# Patient Record
Sex: FEMALE | Race: BLACK OR AFRICAN AMERICAN
[De-identification: names, ages, dates, MRNs, and addresses within clinical notes are randomized per-mention and may not be internally consistent; named-entity substitution may affect disease eponyms.]

---

## 2022-08-10 ENCOUNTER — APPOINTMENT (OUTPATIENT)
Dept: ORTHOPEDIC SURGERY | Facility: CLINIC | Age: 52
End: 2022-08-10

## 2022-08-10 VITALS — HEIGHT: 70 IN | BODY MASS INDEX: 31.5 KG/M2 | WEIGHT: 220 LBS

## 2022-08-10 DIAGNOSIS — Z78.9 OTHER SPECIFIED HEALTH STATUS: ICD-10-CM

## 2022-08-10 PROBLEM — Z00.00 ENCOUNTER FOR PREVENTIVE HEALTH EXAMINATION: Status: ACTIVE | Noted: 2022-08-10

## 2022-08-10 PROCEDURE — 99213 OFFICE O/P EST LOW 20 MIN: CPT

## 2022-08-10 PROCEDURE — 73564 X-RAY EXAM KNEE 4 OR MORE: CPT | Mod: RT

## 2022-08-10 NOTE — PROCEDURE
[Large Joint Injection] : Large joint injection [Right] : of the right [Knee] : knee [Pain] : pain [Inflammation] : inflammation [Alcohol] : alcohol [Betadine] : betadine [Ethyl Chloride sprayed topically] : ethyl chloride sprayed topically [Sterile technique used] : sterile technique used [___ cc    1%] : Lidocaine ~Vcc of 1%  [___ cc    0.5%] : Bupivacaine (Marcaine) ~Vcc of 0.5%  [Effusion] : effusion [] : Patient tolerated procedure well [Previous OTC use and PT nontherapeutic] : patient has tried OTC's including aspirin, Ibuprofen, Aleve, etc or prescription NSAIDS, and/or exercises at home and/or physical therapy without satisfactory response [Patient had decreased mobility in the joint] : patient had decreased mobility in the joint [Risks, benefits, alternatives discussed / Verbal consent obtained] : the risks benefits, and alternatives have been discussed, and verbal consent was obtained [Prior failure or difficult injection] : prior failure or difficult injection [All ultrasound images have been permanently captured and stored accordingly in our picture archiving and communication system] : All ultrasound images have been permanently captured and stored accordingly in our picture archiving and communication system [de-identified] : 17cc [de-identified] : clear/straw

## 2022-08-10 NOTE — DISCUSSION/SUMMARY
[de-identified] : 52f with right knee djd and effusion\par 1) aspiration right knee today - tolerated well \par 2) cryotherapy, rest and activity modification \par 3) discussed availability of visco injections, pt would like to proceed, will request auth. \par 4) rtc with auth for injections\par \par Entered by Lucia Bhatia acting as scribe.\par

## 2022-08-10 NOTE — HISTORY OF PRESENT ILLNESS
[de-identified] : 8/10/22: Here for right knee follow-up. Pain has worsened and is now c/o buckling the past week. States the CSI in march helped for 1 week. \par \par  3/10/22 known right knee oa did well with asp and csi in august. she states over the last 2 weeks pain swelling has returned to the knee which has caused stiffness and ambulation with a limp\par \par Ms. Jeniffer Ramos, a 51-year-old female, presents today for right knee pain for 2 years. 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 She has tried\par NSAIDs, topical creams. Reports that she experienced significant swelling of the right knee about two months ago. \par No prior R knee issues.\par PMH: None

## 2022-08-12 RX ORDER — HYALURONATE SODIUM 20 MG/2 ML
20 SYRINGE (ML) INTRAARTICULAR
Qty: 3 | Refills: 0 | Status: ACTIVE | COMMUNITY
Start: 2022-08-12 | End: 1900-01-01

## 2022-10-11 ENCOUNTER — APPOINTMENT (OUTPATIENT)
Dept: ORTHOPEDIC SURGERY | Facility: CLINIC | Age: 52
End: 2022-10-11
Payer: COMMERCIAL

## 2022-10-11 VITALS — HEIGHT: 70 IN | BODY MASS INDEX: 30.21 KG/M2 | WEIGHT: 211 LBS

## 2022-10-11 PROCEDURE — 20610 DRAIN/INJ JOINT/BURSA W/O US: CPT | Mod: RT

## 2022-10-11 PROCEDURE — 20611 DRAIN/INJ JOINT/BURSA W/US: CPT | Mod: RT

## 2022-10-11 PROCEDURE — 76942 ECHO GUIDE FOR BIOPSY: CPT | Mod: NC

## 2022-10-11 PROCEDURE — 99212 OFFICE O/P EST SF 10 MIN: CPT | Mod: 25

## 2022-10-11 NOTE — HISTORY OF PRESENT ILLNESS
[8] : 8 [5] : 5 [1] : 1 [Euflexxa] : Euflexxa [de-identified] : 10/11/22: Here to f/up right knee and Euflexxa #1\par \par 8/10/22: Here for right knee follow-up. Pain has worsened and is now c/o buckling the past week. States the CSI in march helped for 1 week. \par \par  3/10/22 known right knee oa did well with asp and csi in august. she states over the last 2 weeks pain swelling has returned to the knee which has caused stiffness and ambulation with a limp\par \par Ms. Jeniffer Ramos, a 51-year-old female, presents today for right knee pain for 2 years. 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 She has tried\par NSAIDs, topical creams. Reports that she experienced significant swelling of the right knee about two months ago. \par No prior R knee issues.\par PMH: None \par  [] : This patient has had an injection before: no [FreeTextEntry1] : right knee  [FreeTextEntry5] :  FABIEN BURNHAM is a 52 year female who is here today for right knee euflexxa inj #1

## 2022-10-11 NOTE — DISCUSSION/SUMMARY
[de-identified] : 52f with right knee djd \par 1) Euflexxa #1 today tolerated well\par 2) cryotherapy, rest and activity modification \par 4) rtc 1 week for repeat injection

## 2022-10-11 NOTE — PROCEDURE
[FreeTextEntry3] : Large joint injection was performed  of the right knee. The indication for this procedure was x-ray evidence of Osteoarthritis on this or prior visit. The site was prepped with alcohol and betadine. An injection of Lidocaine 3cc of 1% , Euflexxa, # [ ] was used. \par \par Patient was advised to call if redness, pain or fever occur and apply ice for 15 minutes out of every hour for the next 12-24 hours as tolerated. \par \par Patient has tried OTC's including aspirin, Ibuprofen, Aleve, etc or prescription NSAIDS, and/or exercises at home and/or physical therapy without satisfactory response, patient had decreased mobility in the joint and the risks benefits, and alternatives have been discussed, and verbal consent was obtained. \par \par The risks, benefits and contents of the injection have been discussed.  Risks include but are not limited to allergic reaction, flare reaction, permanent white skin discoloration at the injection site and infection.  The patient understands the risks and agrees to having the injection.  All questions have been answered.\par \par Ultrasound guidance was indicated for this patient due to prior failure or difficult injection.\par \par

## 2022-10-18 ENCOUNTER — APPOINTMENT (OUTPATIENT)
Dept: ORTHOPEDIC SURGERY | Facility: CLINIC | Age: 52
End: 2022-10-18
Payer: COMMERCIAL

## 2022-10-18 VITALS — WEIGHT: 211 LBS | BODY MASS INDEX: 30.21 KG/M2 | HEIGHT: 70 IN

## 2022-10-18 PROCEDURE — 76942 ECHO GUIDE FOR BIOPSY: CPT | Mod: NC

## 2022-10-18 PROCEDURE — 20610 DRAIN/INJ JOINT/BURSA W/O US: CPT

## 2022-10-18 PROCEDURE — 99212 OFFICE O/P EST SF 10 MIN: CPT | Mod: 25

## 2022-10-18 PROCEDURE — 20611 DRAIN/INJ JOINT/BURSA W/US: CPT

## 2022-10-18 NOTE — DISCUSSION/SUMMARY
[de-identified] : 52f with right knee djd \par 1) Euflexxa #2 today tolerated well\par 2) cryotherapy, rest and activity modification \par 4) rtc 1 week for repeat injection\par \par Entered by Lucia Bhatia acting as scribe.\par

## 2022-10-18 NOTE — HISTORY OF PRESENT ILLNESS
[8] : 8 [5] : 5 [2] : 2 [Euflexxa] : Euflexxa [de-identified] : 10/18/22: Here to f/up right knee and Euflexxa #2\par 10/11/22: Here to f/up right knee and Euflexxa #1\par \par 8/10/22: Here for right knee follow-up. Pain has worsened and is now c/o buckling the past week. States the CSI in march helped for 1 week. \par \par  3/10/22 known right knee oa did well with asp and csi in august. she states over the last 2 weeks pain swelling has returned to the knee which has caused stiffness and ambulation with a limp\par \par Ms. Jeniffer Ramos, a 51-year-old female, presents today for right knee pain for 2 years. 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 She has tried\par NSAIDs, topical creams. Reports that she experienced significant swelling of the right knee about two months ago. \par No prior R knee issues.\par PMH: None \par  [] : no [FreeTextEntry1] : right knee  [FreeTextEntry5] :  FABINE BURNHAM is a 52 year female who is here today for right knee euflexxa inj #2 [de-identified] : 10/11/22

## 2022-10-18 NOTE — PROCEDURE
[FreeTextEntry3] : Large joint injection was performed  of the right knee. The indication for this procedure was x-ray evidence of Osteoarthritis on this or prior visit. The site was prepped with alcohol and betadine. An injection of Lidocaine 3cc of 1% , Euflexxa, # [2] was used. \par \par Patient was advised to call if redness, pain or fever occur and apply ice for 15 minutes out of every hour for the next 12-24 hours as tolerated. \par \par Patient has tried OTC's including aspirin, Ibuprofen, Aleve, etc or prescription NSAIDS, and/or exercises at home and/or physical therapy without satisfactory response, patient had decreased mobility in the joint and the risks benefits, and alternatives have been discussed, and verbal consent was obtained. \par \par The risks, benefits and contents of the injection have been discussed.  Risks include but are not limited to allergic reaction, flare reaction, permanent white skin discoloration at the injection site and infection.  The patient understands the risks and agrees to having the injection.  All questions have been answered.\par \par Ultrasound guidance was indicated for this patient due to prior failure or difficult injection.\par \par

## 2022-10-25 ENCOUNTER — APPOINTMENT (OUTPATIENT)
Dept: ORTHOPEDIC SURGERY | Facility: CLINIC | Age: 52
End: 2022-10-25
Payer: COMMERCIAL

## 2022-10-25 VITALS — HEIGHT: 70 IN | BODY MASS INDEX: 29.2 KG/M2 | WEIGHT: 204 LBS

## 2022-10-25 VITALS — WEIGHT: 211 LBS | HEIGHT: 70 IN | BODY MASS INDEX: 30.21 KG/M2

## 2022-10-25 DIAGNOSIS — M17.11 UNILATERAL PRIMARY OSTEOARTHRITIS, RIGHT KNEE: ICD-10-CM

## 2022-10-25 PROCEDURE — 20611 DRAIN/INJ JOINT/BURSA W/US: CPT

## 2022-10-25 PROCEDURE — 76942 ECHO GUIDE FOR BIOPSY: CPT | Mod: NC

## 2022-10-25 PROCEDURE — 99212 OFFICE O/P EST SF 10 MIN: CPT | Mod: 25

## 2022-10-25 PROCEDURE — 20610 DRAIN/INJ JOINT/BURSA W/O US: CPT | Mod: RT

## 2022-10-25 NOTE — HISTORY OF PRESENT ILLNESS
[3] : 3 [Euflexxa] : Euflexxa [6] : 6 [de-identified] : 10/25/22: Here to f/up right knee and Euflexxa #3\par 10/18/22: Here to f/up right knee and Euflexxa #2\par 10/11/22: Here to f/up right knee and Euflexxa #1\par \par 8/10/22: Here for right knee follow-up. Pain has worsened and is now c/o buckling the past week. States the CSI in march helped for 1 week. \par \par  3/10/22 known right knee oa did well with asp and csi in august. she states over the last 2 weeks pain swelling has returned to the knee which has caused stiffness and ambulation with a limp\par \par Ms. Jeniffer Ramos, a 51-year-old female, presents today for right knee pain for 2 years. 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 She has tried\par NSAIDs, topical creams. Reports that she experienced significant swelling of the right knee about two months ago. \par No prior R knee issues.\par PMH: None \par  [] : no [FreeTextEntry1] : right knee  [FreeTextEntry5] :  FABIEN BURNHAM is a 52 year female who is here today for right knee euflexxa inj #3 [de-identified] : 10/18/22 [de-identified] : R knee [de-identified] : Euflexxa [TWNoteComboBox1] : 20%

## 2022-10-25 NOTE — DISCUSSION/SUMMARY
[de-identified] : 52f with right knee djd \par 1) Euflexxa #3 today tolerated well\par 2) cryotherapy, rest and activity modification \par 4) rtc 6 weeks / prn\par \par Entered by Lucia Bhatia acting as scribe.\par

## 2022-10-25 NOTE — PROCEDURE
[FreeTextEntry3] : Large joint injection was performed  of the right knee. The indication for this procedure was x-ray evidence of Osteoarthritis on this or prior visit. The site was prepped with alcohol and betadine. An injection of Lidocaine 3cc of 1% , Euflexxa, # [3] was used. \par \par Patient was advised to call if redness, pain or fever occur and apply ice for 15 minutes out of every hour for the next 12-24 hours as tolerated. \par \par Patient has tried OTC's including aspirin, Ibuprofen, Aleve, etc or prescription NSAIDS, and/or exercises at home and/or physical therapy without satisfactory response, patient had decreased mobility in the joint and the risks benefits, and alternatives have been discussed, and verbal consent was obtained. \par \par The risks, benefits and contents of the injection have been discussed.  Risks include but are not limited to allergic reaction, flare reaction, permanent white skin discoloration at the injection site and infection.  The patient understands the risks and agrees to having the injection.  All questions have been answered.\par \par Ultrasound guidance was indicated for this patient due to prior failure or difficult injection.\par \par

## 2025-03-14 ENCOUNTER — APPOINTMENT (OUTPATIENT)
Dept: ORTHOPEDIC SURGERY | Facility: CLINIC | Age: 55
End: 2025-03-14
Payer: COMMERCIAL

## 2025-03-14 DIAGNOSIS — M17.11 UNILATERAL PRIMARY OSTEOARTHRITIS, RIGHT KNEE: ICD-10-CM

## 2025-03-14 PROCEDURE — J3490M: CUSTOM

## 2025-03-14 PROCEDURE — 20611 DRAIN/INJ JOINT/BURSA W/US: CPT | Mod: 50

## 2025-03-14 PROCEDURE — 99214 OFFICE O/P EST MOD 30 MIN: CPT | Mod: 25

## 2025-03-14 PROCEDURE — 73564 X-RAY EXAM KNEE 4 OR MORE: CPT | Mod: 50

## 2025-03-18 ENCOUNTER — APPOINTMENT (OUTPATIENT)
Dept: ORTHOPEDIC SURGERY | Facility: CLINIC | Age: 55
End: 2025-03-18
Payer: COMMERCIAL

## 2025-03-18 VITALS — HEIGHT: 70 IN | WEIGHT: 204 LBS | BODY MASS INDEX: 29.2 KG/M2

## 2025-03-18 PROCEDURE — 99213 OFFICE O/P EST LOW 20 MIN: CPT | Mod: 25

## 2025-03-18 PROCEDURE — 20611 DRAIN/INJ JOINT/BURSA W/US: CPT | Mod: LT

## 2025-03-20 PROBLEM — M17.0 PRIMARY OSTEOARTHRITIS OF BOTH KNEES: Status: ACTIVE | Noted: 2025-03-14

## 2025-03-25 ENCOUNTER — APPOINTMENT (OUTPATIENT)
Dept: ORTHOPEDIC SURGERY | Facility: CLINIC | Age: 55
End: 2025-03-25
Payer: COMMERCIAL

## 2025-03-25 VITALS — HEIGHT: 70 IN | BODY MASS INDEX: 29.06 KG/M2 | WEIGHT: 203 LBS

## 2025-03-25 PROCEDURE — 20611 DRAIN/INJ JOINT/BURSA W/US: CPT | Mod: RT

## 2025-04-01 ENCOUNTER — APPOINTMENT (OUTPATIENT)
Dept: ORTHOPEDIC SURGERY | Facility: CLINIC | Age: 55
End: 2025-04-01
Payer: COMMERCIAL

## 2025-04-01 VITALS — HEIGHT: 70 IN | WEIGHT: 203 LBS | BODY MASS INDEX: 29.06 KG/M2

## 2025-04-01 PROCEDURE — 20611 DRAIN/INJ JOINT/BURSA W/US: CPT | Mod: RT

## 2025-04-08 ENCOUNTER — APPOINTMENT (OUTPATIENT)
Dept: ORTHOPEDIC SURGERY | Facility: CLINIC | Age: 55
End: 2025-04-08
Payer: COMMERCIAL

## 2025-04-08 VITALS — HEIGHT: 70 IN | BODY MASS INDEX: 29.06 KG/M2 | WEIGHT: 203 LBS

## 2025-04-08 DIAGNOSIS — M17.0 BILATERAL PRIMARY OSTEOARTHRITIS OF KNEE: ICD-10-CM

## 2025-04-08 PROCEDURE — 20611 DRAIN/INJ JOINT/BURSA W/US: CPT | Mod: RT
